# Patient Record
Sex: MALE | Race: WHITE | URBAN - METROPOLITAN AREA
[De-identification: names, ages, dates, MRNs, and addresses within clinical notes are randomized per-mention and may not be internally consistent; named-entity substitution may affect disease eponyms.]

---

## 2021-04-15 ENCOUNTER — OFFICE VISIT (OUTPATIENT)
Dept: URBAN - METROPOLITAN AREA CLINIC 48 | Facility: CLINIC | Age: 31
End: 2021-04-15
Payer: COMMERCIAL

## 2021-04-15 PROCEDURE — 99204 OFFICE O/P NEW MOD 45 MIN: CPT | Performed by: OPTOMETRIST

## 2021-04-15 RX ORDER — PREDNISOLONE ACETATE 10 MG/ML
1 % SUSPENSION/ DROPS OPHTHALMIC
Qty: 5 | Refills: 0 | Status: ACTIVE
Start: 2021-04-15

## 2021-04-15 ASSESSMENT — INTRAOCULAR PRESSURE
OS: 13
OD: 17

## 2021-04-15 NOTE — IMPRESSION/PLAN
Impression: Viral conjunctivitis, unspecified: B30.9. Plan: Recommend discontinuing Ofloxacin (given at hospital). Start Prednisolone TID OD. 

RTC: 1-2 weeks for follow up or sooner if needed.

## 2021-04-22 ENCOUNTER — OFFICE VISIT (OUTPATIENT)
Dept: URBAN - METROPOLITAN AREA CLINIC 48 | Facility: CLINIC | Age: 31
End: 2021-04-22
Payer: COMMERCIAL

## 2021-04-22 DIAGNOSIS — B30.9 VIRAL CONJUNCTIVITIS, UNSPECIFIED: Primary | ICD-10-CM

## 2021-04-22 PROCEDURE — 99213 OFFICE O/P EST LOW 20 MIN: CPT | Performed by: OPTOMETRIST

## 2021-04-22 ASSESSMENT — INTRAOCULAR PRESSURE
OD: 14
OS: 16

## 2021-04-22 NOTE — IMPRESSION/PLAN
Impression: Viral conjunctivitis, unspecified: B30.9. Plan: Resolved on exam. Discussed with patient.  

RTC yearly for eye exam